# Patient Record
Sex: FEMALE | Race: WHITE | Employment: PART TIME | URBAN - METROPOLITAN AREA
[De-identification: names, ages, dates, MRNs, and addresses within clinical notes are randomized per-mention and may not be internally consistent; named-entity substitution may affect disease eponyms.]

---

## 2020-03-10 ENCOUNTER — OFFICE VISIT (OUTPATIENT)
Dept: ORTHOPEDIC SURGERY | Age: 60
End: 2020-03-10
Payer: COMMERCIAL

## 2020-03-10 VITALS — BODY MASS INDEX: 40.18 KG/M2 | HEIGHT: 66 IN | WEIGHT: 250 LBS

## 2020-03-10 PROCEDURE — 99203 OFFICE O/P NEW LOW 30 MIN: CPT | Performed by: ORTHOPAEDIC SURGERY

## 2020-03-10 PROCEDURE — 20550 NJX 1 TENDON SHEATH/LIGAMENT: CPT | Performed by: ORTHOPAEDIC SURGERY

## 2020-03-10 RX ORDER — TRIAMCINOLONE ACETONIDE 40 MG/ML
40 INJECTION, SUSPENSION INTRA-ARTICULAR; INTRAMUSCULAR ONCE
Status: COMPLETED | OUTPATIENT
Start: 2020-03-10 | End: 2020-03-10

## 2020-03-10 RX ADMIN — TRIAMCINOLONE ACETONIDE 40 MG: 40 INJECTION, SUSPENSION INTRA-ARTICULAR; INTRAMUSCULAR at 09:09

## 2020-03-10 NOTE — PROGRESS NOTES
Chief Complaint   Patient presents with    Pain     Right Hand         HISTORY OF PRESENT ILLNESS:   Irwin Sanderson is a 56 y.o. female who presents for evaluation and treatment of right ring finger triggering that began approximately 2 months ago. This affects daily activities involving gripping. Treatment to date has been use of a Band-Aid as a splint to minimize some of the motion of her finger. She does report some similar symptoms in her left long finger that are less severe and have nearly resolved at this point. She reports some occasional numbness and tingling in her fingers particularly in the morning and has tried wearing wrist braces at nighttime but has not found much benefit. This does not affect her on a day-to-day basis however  Her main complaint is been the right ring finger triggering. She does have a history of diabetes mellitus, not insulin-dependent and relatively well controlled per the patient's history      Medical History:  Patient's medications, allergies, past medical, surgical, social and family histories were reviewed and updated as appropriate. ROS:  Pertinent items are noted in HPI  Review of systems reviewed from Patient History Form dated on 3/10/20 and available in the patient's chart under the Media tab. PHYSICAL EXAMINATION:   Gen/Psych: Examination reveals a pleasant individual in no acute distress. The patient is oriented to time, place and person. The patient's mood and affect are appropriate. Lymph: The lymphatic examination bilaterally reveals all areas to be without enlargement or induration. Skin intact without lymphadenopathy, discoloration, or abnormal temperature. Vascular: There is intact, symmetric circulation in both upper extremities.   Brisk capillary refill all fingers  Musculoskeletal       Cervical spine, shoulders, elbows, wrist:  satisfactory baseline range of motion, strength, and stability bilaterally        right ring finger: point the patient is still symptomatic then surgical trigger finger release will be discussed. I also think that the patient likely has some mild symptoms of carpal tunnel syndrome. I advised her to continue with using the wrist braces at night and we will monitor her symptoms.   With any worsening or changing symptoms also consider obtaining electrodiagnostic study for further evaluation      Nuñez Bigger

## 2020-04-06 ENCOUNTER — TELEPHONE (OUTPATIENT)
Dept: ORTHOPEDIC SURGERY | Age: 60
End: 2020-04-06

## 2020-04-06 NOTE — TELEPHONE ENCOUNTER
DUE TO COVID-19 RESTRICTIONS - CALLED PATIENT TO CANCEL APPT FOR 4/7 & TO SCHEDULE * VIRTUAL VISIT - LEFT VM MESSAGE